# Patient Record
Sex: FEMALE | Race: WHITE | NOT HISPANIC OR LATINO
[De-identification: names, ages, dates, MRNs, and addresses within clinical notes are randomized per-mention and may not be internally consistent; named-entity substitution may affect disease eponyms.]

---

## 2021-01-15 ENCOUNTER — TRANSCRIPTION ENCOUNTER (OUTPATIENT)
Age: 81
End: 2021-01-15

## 2021-03-22 ENCOUNTER — TRANSCRIPTION ENCOUNTER (OUTPATIENT)
Age: 81
End: 2021-03-22

## 2021-03-30 ENCOUNTER — TRANSCRIPTION ENCOUNTER (OUTPATIENT)
Age: 81
End: 2021-03-30

## 2021-04-27 ENCOUNTER — TRANSCRIPTION ENCOUNTER (OUTPATIENT)
Age: 81
End: 2021-04-27

## 2021-08-01 ENCOUNTER — TRANSCRIPTION ENCOUNTER (OUTPATIENT)
Age: 81
End: 2021-08-01

## 2021-09-17 ENCOUNTER — TRANSCRIPTION ENCOUNTER (OUTPATIENT)
Age: 81
End: 2021-09-17

## 2021-10-12 ENCOUNTER — TRANSCRIPTION ENCOUNTER (OUTPATIENT)
Age: 81
End: 2021-10-12

## 2021-10-21 ENCOUNTER — TRANSCRIPTION ENCOUNTER (OUTPATIENT)
Age: 81
End: 2021-10-21

## 2021-10-30 ENCOUNTER — TRANSCRIPTION ENCOUNTER (OUTPATIENT)
Age: 81
End: 2021-10-30

## 2021-11-15 ENCOUNTER — TRANSCRIPTION ENCOUNTER (OUTPATIENT)
Age: 81
End: 2021-11-15

## 2021-12-23 ENCOUNTER — TRANSCRIPTION ENCOUNTER (OUTPATIENT)
Age: 81
End: 2021-12-23

## 2022-01-19 ENCOUNTER — TRANSCRIPTION ENCOUNTER (OUTPATIENT)
Age: 82
End: 2022-01-19

## 2022-03-31 ENCOUNTER — TRANSCRIPTION ENCOUNTER (OUTPATIENT)
Age: 82
End: 2022-03-31

## 2022-04-18 ENCOUNTER — TRANSCRIPTION ENCOUNTER (OUTPATIENT)
Age: 82
End: 2022-04-18

## 2022-09-15 ENCOUNTER — APPOINTMENT (OUTPATIENT)
Dept: ORTHOPEDIC SURGERY | Facility: CLINIC | Age: 82
End: 2022-09-15

## 2022-09-15 VITALS — WEIGHT: 165 LBS | BODY MASS INDEX: 26.52 KG/M2 | HEIGHT: 66 IN

## 2022-09-15 PROCEDURE — 99204 OFFICE O/P NEW MOD 45 MIN: CPT

## 2022-09-15 PROCEDURE — 72050 X-RAY EXAM NECK SPINE 4/5VWS: CPT

## 2022-09-15 RX ORDER — MELOXICAM 15 MG/1
15 TABLET ORAL
Qty: 30 | Refills: 0 | Status: ACTIVE | COMMUNITY
Start: 2022-09-15 | End: 1900-01-01

## 2022-09-15 NOTE — ASSESSMENT
[FreeTextEntry1] : Ms. ALANNAH ARIZA is a pleasant  82 year year old woman with neck pain.  Discussed with patient at length Her pain generators and pathology including its natural history.  Discussed treatment options as well including medications, physical therapy, chiropractic care, acupuncture, injection therapy, and possible surgical intervention.  at this time given patients symptoms and history will begin a formal physical therapy regimen focusing on core and trunk strengthening.  We will also provide a prescription for anti-inflammatories.  Discussed major side effects of medication including but not limited to gastritis and acute kidney injury.  She was instructed to take with food and to discontinue use if stomach or esophageal pain developed. The patient will follow up in 2 months after a trial of these treatment modalities.  If the pain persists at that time we will consider MRI of the cervical spine to better evaluate Her underlying pathology. All questions were answered and understanding verbalized.  Patient in agreement with plan.

## 2022-09-15 NOTE — PHYSICAL EXAM
[Flexion] : flexion [Extension] : extension [Rotation to left] : rotation to left [Rotation to right] : rotation to right [] : light touch intact throughout both upper extremities
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
## 2022-09-15 NOTE — IMAGING
[de-identified] : Spine:\par Inspection/Palpation:\par No tenderness to palpation throughout Cervical/thoracic/lumbar spine except at cervical thoracic junction and periscapular\par No bony stepoffs, No lesions.\par \par Gait:\par Non-antalgic, uses a ane\par \par Range of Motion:\par Cervical Spine: Flexion to chin to chest, extension to 70 degrees,  rotation 90 degrees bilaterally, Lateral flexion to 45 degrees bilaterally\par \par \par Neurologic:\par Bilateral upper extremities 5/5 Deltoid/Biceps/Triceps/ Wrist Flexion/Wrist Extension/ / Intrinsics\par \par \par Sensation intact to light touch C5-T1\par \par \par Biceps/Triceps/Brachioradialis Reflex within normal limits\par \par \par Negative Peña's,  No inverted brachioradials reflex\par \par  X-ray Ap/Lateral/Flexion/Extension of cervical spine were viewed and interpreted today.  Degenerative change at 5-6 and C6-7.  Grade 1 spondylolisthesis at C6-7 and C7-T1.  Spondylolisthesis at C5-6 on flexion view.

## 2022-09-28 ENCOUNTER — APPOINTMENT (OUTPATIENT)
Dept: ORTHOPEDIC SURGERY | Facility: CLINIC | Age: 82
End: 2022-09-28

## 2022-11-16 ENCOUNTER — APPOINTMENT (OUTPATIENT)
Dept: ORTHOPEDIC SURGERY | Facility: CLINIC | Age: 82
End: 2022-11-16

## 2022-11-17 ENCOUNTER — APPOINTMENT (OUTPATIENT)
Dept: ORTHOPEDIC SURGERY | Facility: CLINIC | Age: 82
End: 2022-11-17

## 2022-11-23 ENCOUNTER — APPOINTMENT (OUTPATIENT)
Dept: ORTHOPEDIC SURGERY | Facility: CLINIC | Age: 82
End: 2022-11-23

## 2022-11-23 VITALS — WEIGHT: 165 LBS | HEIGHT: 66 IN | BODY MASS INDEX: 26.52 KG/M2

## 2022-11-23 DIAGNOSIS — Z78.9 OTHER SPECIFIED HEALTH STATUS: ICD-10-CM

## 2022-11-23 PROCEDURE — 99213 OFFICE O/P EST LOW 20 MIN: CPT

## 2022-11-23 RX ORDER — DIAZEPAM 5 MG/1
5 TABLET ORAL
Qty: 2 | Refills: 0 | Status: ACTIVE | COMMUNITY
Start: 2022-11-23 | End: 1900-01-01
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
## 2022-11-23 NOTE — ASSESSMENT
[FreeTextEntry1] : 82 F with neck pain. Failed PT and medications\par MRI C spine\par FU after MRI\par DIscussed likely referral to pain management

## 2022-11-23 NOTE — IMAGING
[de-identified] : Spine:\par Inspection/Palpation:\par No tenderness to palpation throughout Cervical/thoracic/lumbar spine except at cervical thoracic junction and periscapular\par No bony stepoffs, No lesions.\par \par Gait:\par Non-antalgic, uses a ane\par \par Range of Motion:\par Cervical Spine: Flexion to chin to chest, extension to 70 degrees,  rotation 90 degrees bilaterally, Lateral flexion to 45 degrees bilaterally\par \par \par Neurologic:\par Bilateral upper extremities 5/5 Deltoid/Biceps/Triceps/ Wrist Flexion/Wrist Extension/ / Intrinsics\par \par \par Sensation intact to light touch C5-T1\par \par \par Biceps/Triceps/Brachioradialis Reflex within normal limits\par \par \par Negative Peña's,  No inverted brachioradials reflex\par \par  X-ray Ap/Lateral/Flexion/Extension of cervical spine were viewed and interpreted today.  Degenerative change at 5-6 and C6-7.  Grade 1 spondylolisthesis at C6-7 and C7-T1.  Spondylolisthesis at C5-6 on flexion view.

## 2022-12-04 ENCOUNTER — FORM ENCOUNTER (OUTPATIENT)
Age: 82
End: 2022-12-04

## 2022-12-05 ENCOUNTER — APPOINTMENT (OUTPATIENT)
Dept: MRI IMAGING | Facility: CLINIC | Age: 82
End: 2022-12-05

## 2022-12-05 PROCEDURE — 72141 MRI NECK SPINE W/O DYE: CPT | Mod: MH

## 2022-12-14 ENCOUNTER — APPOINTMENT (OUTPATIENT)
Dept: ORTHOPEDIC SURGERY | Facility: CLINIC | Age: 82
End: 2022-12-14

## 2022-12-14 VITALS — BODY MASS INDEX: 26.52 KG/M2 | HEIGHT: 66 IN | WEIGHT: 165 LBS

## 2022-12-14 PROCEDURE — 99213 OFFICE O/P EST LOW 20 MIN: CPT
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
## 2022-12-14 NOTE — ASSESSMENT
[FreeTextEntry1] : 82 F with neck pain. Failed PT and medications.  Pain has improved since last visit. Discussed ELMO.\par Will see if pain continues to be better\par If it worsens will call for pain management appointment\par Tylenol PRN

## 2022-12-14 NOTE — IMAGING
[de-identified] : Spine:\par Inspection/Palpation:\par No tenderness to palpation throughout Cervical/thoracic/lumbar spine except at cervical thoracic junction and periscapular\par No bony stepoffs, No lesions.\par \par Gait:\par Non-antalgic, uses a ane\par \par Range of Motion:\par Cervical Spine: Flexion to chin to chest, extension to 70 degrees,  rotation 90 degrees bilaterally, Lateral flexion to 45 degrees bilaterally\par \par \par Neurologic:\par Bilateral upper extremities 5/5 Deltoid/Biceps/Triceps/ Wrist Flexion/Wrist Extension/ / Intrinsics\par \par \par Sensation intact to light touch C5-T1\par \par \par Biceps/Triceps/Brachioradialis Reflex within normal limits\par \par \par Negative Peña's,  No inverted brachioradials reflex\par \par  X-ray Ap/Lateral/Flexion/Extension of cervical spine were viewed and interpreted today.  Degenerative change at 5-6 and C6-7.  Grade 1 spondylolisthesis at C6-7 and C7-T1.  Spondylolisthesis at C5-6 on flexion view.\par \par MRI [facility] reviewed and interpreted independently today.  Multilevel degenerative changes without any severe central stenosis. No cord compression.  report as follows\par \par 1. Diffuse loss of disc signal and height. No fracture.\par 2. C1-C2: Capsular thickening.\par 3. C2-C3: Grade 1 anterior spondylolisthesis. Luschka hypertrophy and facet arthrosis with inferior right \par foraminal stenosis.\par 4. C3-C4: Grade 1 anterior spondylolisthesis. Broad bulge. Luschka hypertrophy, facet arthrosis with left \par foraminal stenosis.\par 5. C4-C5: Grade 1 anterior spondylolisthesis. Broad bulge with central herniation. Facet arthrosis.\par 6. C5-C6: Bulge and broad herniation impressing on the thecal sac. Luschka hypertrophy and facet arthrosis \par with inferior left foraminal stenosis.\par 7. C6-C7: Broad bulge. Left foraminal herniation with Luschka hypertrophy and facet arthrosis with left \par foraminal stenosis.

## 2023-01-12 ENCOUNTER — APPOINTMENT (OUTPATIENT)
Dept: PAIN MANAGEMENT | Facility: CLINIC | Age: 83
End: 2023-01-12
Payer: MEDICARE

## 2023-01-12 VITALS — BODY MASS INDEX: 27.16 KG/M2 | WEIGHT: 169 LBS | HEIGHT: 66 IN

## 2023-01-12 PROCEDURE — 99204 OFFICE O/P NEW MOD 45 MIN: CPT

## 2023-01-12 NOTE — CONSULT LETTER
[Dear  ___] : Dear  [unfilled], [Consult Letter:] : I had the pleasure of evaluating your patient, [unfilled]. [Please see my note below.] : Please see my note below. [Consult Closing:] : Thank you very much for allowing me to participate in the care of this patient.  If you have any questions, please do not hesitate to contact me. [Sincerely,] : Sincerely, [FreeTextEntry3] : Alonzo Oliva MD\par

## 2023-01-12 NOTE — DISCUSSION/SUMMARY
[Surgical risks reviewed] : Surgical risks reviewed [de-identified] : After discussing various treatment options with the patient including but not limited to oral medications, physical therapy, exercise,\par modalities as well as interventional spinal injections, we have decided with the following plan\par \par I personally reviewed the MRI/CT scan images and agree with the radiologist's report. The\par radiological findings were discussed with the patient.\par \par \par The risks, benefits, contents and alternatives to injection were explained in full to the patient. Risks outlined include but are not\par limited to infection,sepsis, bleeding, post-dural puncture headache, nerve damage, temporary increase in pain, syncopal episode,\par failure to resolve symptoms, allergic reaction, symptom recurrence, and elevation of blood sugar in diabetics. Cortisone may cause\par immunosuppression. Patient understands the risks. All questions were answered. After discussion of options, patient requested\par an injection. Information regarding the injection was given to the patient. Which medications to stop prior to the injection was\par explained to the patient as well.\par \par Follow up in 1-2 weeks post injection for re-evaluation.\par \par  Conservative Care\par Continue Home exercises, stretching, activity modification, physical therapy, and conservative care.\par \par C7-T1 DRAKE\par \par

## 2023-01-12 NOTE — HISTORY OF PRESENT ILLNESS
[Neck] : neck [Lower back] : lower back [Dull/Aching] : dull/aching [Sharp] : sharp [] : yes [Frequent] : frequent [Household chores] : household chores [Leisure] : leisure [Social interactions] : social interactions [Walking] : walking [Extending back] : extending back [Exercising] : exercising [Stairs] : stairs [FreeTextEntry1] : Neck pain, non radiating. Pain began approx. April sudden onset maybe triggered by a burst of travel [place] and [place] but it did not improve and she sought treatment. No prior episodes. No prior cervical injections or surgery. \par \par Pain is improved with ambien and sleep. Pain is worsened with sitting and driving. \par \par MRI reviewed.

## 2023-01-20 ENCOUNTER — APPOINTMENT (OUTPATIENT)
Dept: PAIN MANAGEMENT | Facility: CLINIC | Age: 83
End: 2023-01-20
Payer: MEDICARE

## 2023-01-20 PROCEDURE — 62321 NJX INTERLAMINAR CRV/THRC: CPT

## 2023-01-20 NOTE — PROCEDURE
[FreeTextEntry3] : Date of Service: 01/20/2023 \par \par Account: [id number] \par \par Patient: ALANNAH ARIZA \par \par YOB: 1940 \par \par Age: 82 year \par \par Surgeon:                                                      Alonzo Oliva M.D.\par \par Pre-Operative Diagnosis:                         Cervical Radiculopathy (M54.12) \par \par Post Operative Diagnosis:                       Cervical Radiculopathy (M54.12)\par \par Procedure:                                                  Interlaminar cervical epidural steroid injection (C7-T1) under fluoroscopic guidance\par \par Anesthesia:                                                 MAC\par \par \par This procedure was carried out using fluoroscopic guidance.  The risks and benefits of the procedure were discussed extensively with the patient.  The consent of the patient was obtained and the following procedure was performed.\par \par The patient was placed in the prone position using a thoracic and chin support.  The cervical area was prepped and draped in a sterile fashion.  The fluoroscope visualized the C7-T1 interspace using slight cephalad-caudad angulation and this area was marked.  Using sterile technique the superficial skin was anesthetized with 1% Lidocaine without epinephrine.  A 18 gauge Tuohoy needle was advanced under fluoroscopy using [id number] technique until ligament was engaged.  The stilette was then removed and a column of preservative free normal saline flushed throught the tuohoy needle and left with a concave fluid level above the butterfly portion of the tuohoy needle.  The needle was then advanced under fluoroscopic guidance until the column of saline disappeared.  Lateral view confirmed final needle tip placement in the epidural space.  After negative aspiration for heme and CSF, 1 cc of Omnipaque confirmed good cervical epiduragram.  \par \par Cervical epidurogram showed no evidence of intrathecal or intravascular flow, and good bilateral epidural flow from C3 to T2 levels.  An injectate of 6 cc of preservative free normal saline plus 12 mg of betamethasone was then injected into the epidural space. The needle was subsequently removed and pressure was applied.\par \par Anesthesia personnel were present throughout the procedure.  The patient tolerated the procedure well and was instructed to contact me immediately if there were any problems.\par \par Alonzo Oliva M.D.\par

## 2023-02-02 ENCOUNTER — APPOINTMENT (OUTPATIENT)
Dept: PAIN MANAGEMENT | Facility: CLINIC | Age: 83
End: 2023-02-02
Payer: MEDICARE

## 2023-02-02 VITALS — HEIGHT: 66 IN | WEIGHT: 165 LBS | BODY MASS INDEX: 26.52 KG/M2

## 2023-02-02 DIAGNOSIS — M54.12 RADICULOPATHY, CERVICAL REGION: ICD-10-CM

## 2023-02-02 DIAGNOSIS — M25.552 PAIN IN LEFT HIP: ICD-10-CM

## 2023-02-02 DIAGNOSIS — M54.2 CERVICALGIA: ICD-10-CM

## 2023-02-02 PROCEDURE — 99214 OFFICE O/P EST MOD 30 MIN: CPT

## 2023-02-02 PROCEDURE — 73030 X-RAY EXAM OF SHOULDER: CPT | Mod: LT

## 2023-02-02 PROCEDURE — 73502 X-RAY EXAM HIP UNI 2-3 VIEWS: CPT

## 2023-02-02 NOTE — ASSESSMENT
[FreeTextEntry1] : s/p DRAKE C7-T1 \par \par 85-90% relief until fall. \par \par She did not seek medical attention following fall and she states it re-aggravated her cervical pain. \par \par Pt declines hip troch bursa injection. \par \par Will start PT and followup prn.

## 2023-02-02 NOTE — DISCUSSION/SUMMARY
[Surgical risks reviewed] : Surgical risks reviewed [de-identified] : After discussing various treatment options with the patient including but not limited to oral medications, physical therapy, exercise,\par modalities as well as interventional spinal injections, we have decided with the following plan\par \par I personally reviewed the MRI/CT scan images and agree with the radiologist's report. The\par radiological findings were discussed with the patient.\par \par \par The risks, benefits, contents and alternatives to injection were explained in full to the patient. Risks outlined include but are not\par limited to infection,sepsis, bleeding, post-dural puncture headache, nerve damage, temporary increase in pain, syncopal episode,\par failure to resolve symptoms, allergic reaction, symptom recurrence, and elevation of blood sugar in diabetics. Cortisone may cause\par immunosuppression. Patient understands the risks. All questions were answered. After discussion of options, patient requested\par an injection. Information regarding the injection was given to the patient. Which medications to stop prior to the injection was\par explained to the patient as well.\par \par Follow up in 1-2 weeks post injection for re-evaluation.\par \par  Conservative Care\par Continue Home exercises, stretching, activity modification, physical therapy, and conservative care.\par \par Proceed with C7-T1 DRAKE\par

## 2023-02-02 NOTE — DATA REVIEWED
[FreeTextEntry1] : Xrays no fracture, shoulder or L hip, some OA present, if no improvement over 10-14 days will see Lazaro for shoulder

## 2023-02-02 NOTE — PHYSICAL EXAM
[Normal Coordination] : normal coordination [Normal DTR UE/LE] : normal DTR UE/LE  [Normal Sensation] : normal sensation [Normal Mood and Affect] : normal mood and affect [Able to Communicate] : able to communicate [Normal Skin] : normal skin [No Rash] : no rash [No Ulcers] : no ulcers [No Lesions] : no lesions [No obvious lymphadenopathy in areas examined] : no obvious lymphadenopathy in areas examined [Well Developed] : well developed [Well Nourished] : well nourished [No Respiratory Distress] : no respiratory distress [Left] : left shoulder [] : tenderness to palpation [FreeTextEntry8] : No echcymosis seen.

## 2023-02-02 NOTE — HISTORY OF PRESENT ILLNESS
[Neck] : neck [Lower back] : lower back [8] : 8 [Dull/Aching] : dull/aching [Sharp] : sharp [Constant] : constant [Household chores] : household chores [Leisure] : leisure [Rest] : rest [Standing] : standing [Walking] : walking [de-identified] : Pt had a DRAKE and reports significant relief >85-90% but the Tuesday after her Friday injection she went to the grocery store and ran errands without eating and while checking out at the grocery store she had a syncopal episode likely due to hypoglycemia as after she gained consciousness and had some food a banana and some cookies she felt immediately better.

## 2023-02-03 ENCOUNTER — APPOINTMENT (OUTPATIENT)
Dept: PAIN MANAGEMENT | Facility: CLINIC | Age: 83
End: 2023-02-03

## 2023-02-17 ENCOUNTER — APPOINTMENT (OUTPATIENT)
Dept: PAIN MANAGEMENT | Facility: CLINIC | Age: 83
End: 2023-02-17
Payer: MEDICARE

## 2023-02-17 PROCEDURE — 62321 NJX INTERLAMINAR CRV/THRC: CPT

## 2023-02-17 NOTE — PROCEDURE
[FreeTextEntry3] : Date of Service: 02/17/2023 \par \par Account: [id number] \par \par Patient: ALANNAH ARIZA \par \par YOB: 1940 \par \par Age: 83 year \par \par Surgeon:                                                      Alonzo Oliva M.D.\par \par Pre-Operative Diagnosis:                         Cervical Radiculopathy (M54.12) \par \par Post Operative Diagnosis:                       Cervical Radiculopathy (M54.12)\par \par Procedure:                                                  Interlaminar cervical epidural steroid injection (C7-T1) under fluoroscopic guidance\par \par Anesthesia:                                                 MAC\par \par \par This procedure was carried out using fluoroscopic guidance.  The risks and benefits of the procedure were discussed extensively with the patient.  The consent of the patient was obtained and the following procedure was performed.\par \par The patient was placed in the prone position using a thoracic and chin support.  The cervical area was prepped and draped in a sterile fashion.  The fluoroscope visualized the C7-T1 interspace using slight cephalad-caudad angulation and this area was marked.  Using sterile technique the superficial skin was anesthetized with 1% Lidocaine without epinephrine.  A 18 gauge Tuohoy needle was advanced under fluoroscopy using gsuet-[id number] technique until ligament was engaged.  The stilette was then removed and a column of preservative free normal saline flushed throught the tuohoy needle and left with a concave fluid level above the butterfly portion of the tuohoy needle.  The needle was then advanced under fluoroscopic guidance until the column of saline disappeared.  Lateral view confirmed final needle tip placement in the epidural space.  After negative aspiration for heme and CSF, 1 cc of Omnipaque confirmed good cervical epiduragram.  \par \par Cervical epidurogram showed no evidence of intrathecal or intravascular flow, and good bilateral epidural flow from C3 to T2 levels.  An injectate of 6 cc of preservative free normal saline plus 12 mg of betamethasone was then injected into the epidural space. The needle was subsequently removed and pressure was applied.\par \par Anesthesia personnel were present throughout the procedure.  The patient tolerated the procedure well and was instructed to contact me immediately if there were any problems.\par \par Alonzo Oliva M.D.\par

## 2023-03-23 ENCOUNTER — APPOINTMENT (OUTPATIENT)
Dept: PAIN MANAGEMENT | Facility: CLINIC | Age: 83
End: 2023-03-23
Payer: MEDICARE

## 2023-03-23 VITALS — WEIGHT: 169 LBS | HEIGHT: 66 IN | BODY MASS INDEX: 27.16 KG/M2

## 2023-03-23 PROCEDURE — 99213 OFFICE O/P EST LOW 20 MIN: CPT

## 2023-03-23 NOTE — DISCUSSION/SUMMARY
[Surgical risks reviewed] : Surgical risks reviewed [de-identified] : After discussing various treatment options with the patient including but not limited to oral medications, physical therapy, exercise,\par modalities as well as interventional spinal injections, we have decided with the following plan\par \par I personally reviewed the MRI/CT scan images and agree with the radiologist's report. The\par radiological findings were discussed with the patient.\par \par \par The risks, benefits, contents and alternatives to injection were explained in full to the patient. Risks outlined include but are not\par limited to infection,sepsis, bleeding, post-dural puncture headache, nerve damage, temporary increase in pain, syncopal episode,\par failure to resolve symptoms, allergic reaction, symptom recurrence, and elevation of blood sugar in diabetics. Cortisone may cause\par immunosuppression. Patient understands the risks. All questions were answered. After discussion of options, patient requested\par an injection. Information regarding the injection was given to the patient. Which medications to stop prior to the injection was\par explained to the patient as well.\par \par Follow up in 1-2 weeks post injection for re-evaluation.\par \par  Conservative Care\par Continue Home exercises, stretching, activity modification, physical therapy, and conservative care.\par \par Proceed with C7-T1 DRAKE\par will call

## 2023-03-23 NOTE — HISTORY OF PRESENT ILLNESS
[Neck] : neck [Lower back] : lower back [Dull/Aching] : dull/aching [Social interactions] : social interactions [Rest] : rest [Injection therapy] : injection therapy [8] : 8 [Sharp] : sharp [Constant] : constant [Household chores] : household chores [Leisure] : leisure [Standing] : standing [Walking] : walking [FreeTextEntry8] : getting out of bed  [de-identified] : lifting, getting out of bed  [de-identified] : 3/23/23- had 90% relief\par \par Pt had a DRAKE and reports significant relief >85-90% but the Tuesday after her Friday injection she went to the grocery store and ran errands without eating and while checking out at the grocery store she had a syncopal episode likely due to hypoglycemia as after she gained consciousness and had some food a banana and some cookies she felt immediately better.

## 2023-03-23 NOTE — PHYSICAL EXAM
[Normal Coordination] : normal coordination [Normal DTR UE/LE] : normal DTR UE/LE  [Normal Sensation] : normal sensation [Normal Mood and Affect] : normal mood and affect [Able to Communicate] : able to communicate [Normal Skin] : normal skin [No Rash] : no rash [No Ulcers] : no ulcers [No Lesions] : no lesions [No obvious lymphadenopathy in areas examined] : no obvious lymphadenopathy in areas examined [Well Developed] : well developed [Well Nourished] : well nourished [No Respiratory Distress] : no respiratory distress [Left] : left hip [] : greater trochanteric tenderness [FreeTextEntry8] : No echcymosis seen.

## 2023-04-19 ENCOUNTER — APPOINTMENT (OUTPATIENT)
Dept: ORTHOPEDIC SURGERY | Facility: CLINIC | Age: 83
End: 2023-04-19
Payer: MEDICARE

## 2023-04-19 VITALS — WEIGHT: 169 LBS | HEIGHT: 66 IN | BODY MASS INDEX: 27.16 KG/M2

## 2023-04-19 PROCEDURE — 73080 X-RAY EXAM OF ELBOW: CPT | Mod: LT

## 2023-04-19 PROCEDURE — 20550 NJX 1 TENDON SHEATH/LIGAMENT: CPT | Mod: RT

## 2023-04-19 PROCEDURE — 73130 X-RAY EXAM OF HAND: CPT | Mod: RT

## 2023-04-19 PROCEDURE — 99213 OFFICE O/P EST LOW 20 MIN: CPT | Mod: 25

## 2023-04-19 RX ORDER — DIAZEPAM 5 MG/1
5 TABLET ORAL AS DIRECTED
Qty: 2 | Refills: 0 | Status: ACTIVE | COMMUNITY
Start: 2023-04-19 | End: 1900-01-01

## 2023-04-19 NOTE — HISTORY OF PRESENT ILLNESS
[Gradual] : gradual [Radiating] : radiating [de-identified] : 4/19/2023: RHD 82 yo female her with right hand pain x 1.5 years and left anterior elbow pain x 1 year.  \par \par Pt has has hx of multiple orthopedic issues due to being [place].\par Pt has hx of left ulnar nerve transposition in 1994.\par \par PMH: Sleep disorder (Ambien)\par Allergies: Morphine (vomiting) [] : no [FreeTextEntry1] : right hand/left elbow  [FreeTextEntry5] : patient is feeling increasing pain in the elbow and feels that it may be from using her cane more consistently. She is also feeling increasing pain in the hand for over a year

## 2023-04-19 NOTE — IMAGING
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

## 2023-04-26 ENCOUNTER — FORM ENCOUNTER (OUTPATIENT)
Age: 83
End: 2023-04-26

## 2023-04-26 ENCOUNTER — APPOINTMENT (OUTPATIENT)
Dept: MRI IMAGING | Facility: CLINIC | Age: 83
End: 2023-04-26

## 2023-04-27 ENCOUNTER — APPOINTMENT (OUTPATIENT)
Dept: MRI IMAGING | Facility: CLINIC | Age: 83
End: 2023-04-27
Payer: MEDICARE

## 2023-04-27 PROCEDURE — 73221 MRI JOINT UPR EXTREM W/O DYE: CPT | Mod: LT,MH

## 2023-05-03 ENCOUNTER — APPOINTMENT (OUTPATIENT)
Dept: ORTHOPEDIC SURGERY | Facility: CLINIC | Age: 83
End: 2023-05-03
Payer: MEDICARE

## 2023-05-03 VITALS — HEIGHT: 66 IN | WEIGHT: 169 LBS | BODY MASS INDEX: 27.16 KG/M2

## 2023-05-03 DIAGNOSIS — M65.341 TRIGGER FINGER, RIGHT RING FINGER: ICD-10-CM

## 2023-05-03 DIAGNOSIS — M72.0 PALMAR FASCIAL FIBROMATOSIS [DUPUYTREN]: ICD-10-CM

## 2023-05-03 DIAGNOSIS — R22.31 LOCALIZED SWELLING, MASS AND LUMP, RIGHT UPPER LIMB: ICD-10-CM

## 2023-05-03 DIAGNOSIS — D17.22 BENIGN LIPOMATOUS NEOPLASM OF SKIN AND SUBCUTANEOUS TISSUE OF LEFT ARM: ICD-10-CM

## 2023-05-03 PROCEDURE — 99213 OFFICE O/P EST LOW 20 MIN: CPT

## 2023-05-03 NOTE — HISTORY OF PRESENT ILLNESS
[Gradual] : gradual [Radiating] : radiating [de-identified] : 5/3/2023: Pt here s/p MRI of the right left elbow which shows:\par 1. No soft tissue lesion or discrete lipoma in the region marked by vitamin E capsule.\par 2. Common extensor tendinopathy and fraying with traction spurring at the humeral origin. Lateral collateral \par ligament sprain.\par 3. Healed avulsion injury at the origin of the anterior common flexor tendons with 8-mm spur. Nonunited small \par 5-mm avulsion injuries at the more posterior origin of the common flexor tendons and ulnar collateral ligament\par with no ulnar collateral ligament tear.\par 4. Diffuse arthrosis with joint effusion.\par 5. Insertional tendinopathy of the biceps with traction spurring but no tear or bursitis.\par \par Right ring trigger finger has resolved s/p CSI #1.\par \par \par \par \par 4/19/2023: RHD 84 yo female her with right hand pain x 1.5 years and left anterior elbow pain x 1 year.  \par \par Pt has has hx of multiple orthopedic issues due to [place]par Pt has hx of left ulnar nerve transposition in 1994.\par \par PMH: Sleep disorder (Ambien)\par Allergies: Morphine (vomiting) [] : no [FreeTextEntry1] : right hand/left elbow  [FreeTextEntry5] : patient is feeling increasing pain in the elbow and feels that it may be from using her cane more consistently. She is also feeling increasing pain in the hand for over a year

## 2023-05-03 NOTE — ASSESSMENT
[FreeTextEntry1] : The patient was advised of the diagnosis. The natural history of the pathology was explained in full to the patient in layman's terms. All questions were answered. The risks and benefits of surgical and non-surgical treatment alternatives were explained in full to the patient.\par \par Pt will observe right hand mass to region of 4th MC.\par Possibility of surgical intervention was discussed.\par MRI reviewed with patient today.\par Pt was provided reassurance and she will consider surgical excision of right hand mass.\par Pt will rto in 1 mos for f/u care.

## 2023-05-03 NOTE — DATA REVIEWED
[MRI] : MRI [Left] : left [I independently reviewed and interpreted images and report] : I independently reviewed and interpreted images and report [Elbow] : elbow [FreeTextEntry1] : There is no evidence of a tumor / pt with distal biceps tendinitis which is symptomatic.

## 2023-05-03 NOTE — IMAGING
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 \par no longer TTP over the right hand ring finger Depuytrens contracture and A1 pulley only.\par Small cyst palpated that is mobile. \par There is no triggering.\par All digits are nvi with FAROM.\par ,intrinsic and pinch strength is 5/5.\par Wrist strength is 5/5 in all planes.\par There is no carpal instability.\par TFCC and Finkelstein Tests are negative.\par \par \par Left elbow with anterior 2x3 cm soft , mobile mass that is minimally ttp.\par there is ttp over the distal biceps tendon\par Pt notes mild pain with full extension.\par ROM is full with 5/5 strength in all planes.\par There is no ligamentous laxity noted.\par Left shoulder with no ttp and full ROM.\par There is no ttp over the forearm region.\par LUE is with 5/5 strength and all digits are nvi.

## 2023-06-07 ENCOUNTER — APPOINTMENT (OUTPATIENT)
Dept: ORTHOPEDIC SURGERY | Facility: CLINIC | Age: 83
End: 2023-06-07

## 2023-07-19 ENCOUNTER — APPOINTMENT (OUTPATIENT)
Dept: PAIN MANAGEMENT | Facility: CLINIC | Age: 83
End: 2023-07-19

## 2024-04-30 ENCOUNTER — OFFICE (OUTPATIENT)
Dept: URBAN - METROPOLITAN AREA CLINIC 63 | Facility: CLINIC | Age: 84
Setting detail: OPHTHALMOLOGY
End: 2024-04-30
Payer: MEDICARE

## 2024-04-30 VITALS — HEIGHT: 55 IN

## 2024-04-30 DIAGNOSIS — Z96.1: ICD-10-CM

## 2024-04-30 DIAGNOSIS — H35.373: ICD-10-CM

## 2024-04-30 DIAGNOSIS — H35.363: ICD-10-CM

## 2024-04-30 DIAGNOSIS — H43.392: ICD-10-CM

## 2024-04-30 PROCEDURE — 92004 COMPRE OPH EXAM NEW PT 1/>: CPT | Performed by: STUDENT IN AN ORGANIZED HEALTH CARE EDUCATION/TRAINING PROGRAM

## 2024-04-30 PROCEDURE — 92250 FUNDUS PHOTOGRAPHY W/I&R: CPT | Performed by: STUDENT IN AN ORGANIZED HEALTH CARE EDUCATION/TRAINING PROGRAM

## 2024-05-01 PROBLEM — H35.363 DRUSEN; BOTH EYES: Status: ACTIVE | Noted: 2024-04-30

## 2024-05-01 ASSESSMENT — REFRACTION_CURRENTRX
OD_CYLINDER: -0.50
OS_OVR_VA: 20/
OD_OVR_VA: 20/
OS_OVR_VA: 20/
OS_ADD: +2.50
OD_VPRISM_DIRECTION: PROGS
OD_SPHERE: +1.00
OD_SPHERE: +2.75
OD_ADD: +2.50
OS_SPHERE: +2.75
OS_VPRISM_DIRECTION: PROGS
OS_VPRISM_DIRECTION: SV
OD_OVR_VA: 20/
OD_AXIS: 155
OS_SPHERE: +1.25
OS_CYLINDER: SPH
OD_VPRISM_DIRECTION: SV

## 2024-05-01 ASSESSMENT — REFRACTION_MANIFEST
OS_ADD: +2.50
OS_SPHERE: +0.25
OD_SPHERE: -0.25
OS_AXIS: 080
OD_AXIS: 065
OD_VA1: 20/20
OS_VA1: 20/20
OS_CYLINDER: -0.50
OD_CYLINDER: -0.50
OD_ADD: +2.50

## 2024-05-01 ASSESSMENT — KERATOMETRY
OD_K1POWER_DIOPTERS: 43.75
OD_AXISANGLE_DEGREES: 160
OS_K2POWER_DIOPTERS: 44.00
OS_K1POWER_DIOPTERS: 43.50
OS_AXISANGLE_DEGREES: 125
OD_K2POWER_DIOPTERS: 44.00
METHOD_AUTO_MANUAL: AUTO

## 2025-08-04 ENCOUNTER — APPOINTMENT (OUTPATIENT)
Dept: ORTHOPEDIC SURGERY | Facility: CLINIC | Age: 85
End: 2025-08-04
Payer: MEDICARE

## 2025-08-04 VITALS — HEIGHT: 66 IN | WEIGHT: 170 LBS | BODY MASS INDEX: 27.32 KG/M2

## 2025-08-04 DIAGNOSIS — M25.461 EFFUSION, RIGHT KNEE: ICD-10-CM

## 2025-08-04 PROCEDURE — 73564 X-RAY EXAM KNEE 4 OR MORE: CPT | Mod: RT

## 2025-08-04 PROCEDURE — 99214 OFFICE O/P EST MOD 30 MIN: CPT | Mod: 25

## 2025-08-04 PROCEDURE — J3490M: CUSTOM | Mod: NC,JZ

## 2025-08-04 PROCEDURE — 20611 DRAIN/INJ JOINT/BURSA W/US: CPT | Mod: RT

## 2025-08-15 ENCOUNTER — APPOINTMENT (OUTPATIENT)
Dept: ORTHOPEDIC SURGERY | Facility: CLINIC | Age: 85
End: 2025-08-15
Payer: MEDICARE

## 2025-08-15 DIAGNOSIS — M17.11 UNILATERAL PRIMARY OSTEOARTHRITIS, RIGHT KNEE: ICD-10-CM

## 2025-08-15 PROCEDURE — 99214 OFFICE O/P EST MOD 30 MIN: CPT

## 2025-08-21 ENCOUNTER — APPOINTMENT (OUTPATIENT)
Dept: ORTHOPEDIC SURGERY | Facility: CLINIC | Age: 85
End: 2025-08-21